# Patient Record
Sex: FEMALE | URBAN - METROPOLITAN AREA
[De-identification: names, ages, dates, MRNs, and addresses within clinical notes are randomized per-mention and may not be internally consistent; named-entity substitution may affect disease eponyms.]

---

## 2018-05-07 ENCOUNTER — APPOINTMENT (RX ONLY)
Dept: URBAN - METROPOLITAN AREA CLINIC 31 | Facility: CLINIC | Age: 83
Setting detail: DERMATOLOGY
End: 2018-05-07

## 2018-05-07 DIAGNOSIS — D22 MELANOCYTIC NEVI: ICD-10-CM

## 2018-05-07 DIAGNOSIS — L57.0 ACTINIC KERATOSIS: ICD-10-CM

## 2018-05-07 DIAGNOSIS — L81.4 OTHER MELANIN HYPERPIGMENTATION: ICD-10-CM

## 2018-05-07 PROBLEM — D22.5 MELANOCYTIC NEVI OF TRUNK: Status: ACTIVE | Noted: 2018-05-07

## 2018-05-07 PROBLEM — D48.5 NEOPLASM OF UNCERTAIN BEHAVIOR OF SKIN: Status: ACTIVE | Noted: 2018-05-07

## 2018-05-07 PROCEDURE — ? LIQUID NITROGEN

## 2018-05-07 PROCEDURE — 11100: CPT | Mod: 59

## 2018-05-07 PROCEDURE — 99202 OFFICE O/P NEW SF 15 MIN: CPT | Mod: 25

## 2018-05-07 PROCEDURE — ? BIOPSY BY SHAVE METHOD

## 2018-05-07 PROCEDURE — ? OBSERVATION AND MEASURE

## 2018-05-07 PROCEDURE — 17000 DESTRUCT PREMALG LESION: CPT

## 2018-05-07 PROCEDURE — ? COUNSELING

## 2018-05-07 PROCEDURE — 17003 DESTRUCT PREMALG LES 2-14: CPT

## 2018-05-07 ASSESSMENT — LOCATION DETAILED DESCRIPTION DERM
LOCATION DETAILED: LEFT INFERIOR LATERAL MALAR CHEEK
LOCATION DETAILED: RIGHT PROXIMAL DORSAL FOREARM
LOCATION DETAILED: RIGHT LATERAL MALAR CHEEK
LOCATION DETAILED: RIGHT INFERIOR VERMILION LIP
LOCATION DETAILED: LEFT LATERAL ABDOMEN
LOCATION DETAILED: LEFT DISTAL DORSAL FOREARM

## 2018-05-07 ASSESSMENT — LOCATION SIMPLE DESCRIPTION DERM
LOCATION SIMPLE: RIGHT FOREARM
LOCATION SIMPLE: ABDOMEN
LOCATION SIMPLE: RIGHT LIP
LOCATION SIMPLE: LEFT CHEEK
LOCATION SIMPLE: LEFT FOREARM
LOCATION SIMPLE: RIGHT CHEEK

## 2018-05-07 ASSESSMENT — LOCATION ZONE DERM
LOCATION ZONE: LIP
LOCATION ZONE: TRUNK
LOCATION ZONE: FACE
LOCATION ZONE: ARM

## 2018-05-07 NOTE — PROCEDURE: BIOPSY BY SHAVE METHOD
Anesthesia Volume In Cc: 0.5
Type Of Destruction Used: Curettage
Size Of Lesion In Cm: 0.6
Post-Care Instructions: I reviewed with the patient in detail post-care instructions. Patient is to keep the biopsy site dry overnight, and then apply vaseline twice daily until healed.
Billing Type: Third-Party Bill
Biopsy Method: Personna blade
Anesthesia Type: 1% lidocaine with epinephrine
Consent: Written consent was obtained and risks were reviewed including but not limited to scarring, infection, bleeding, scabbing, incomplete removal, nerve damage and allergy to anesthesia.
Electrodesiccation Text: The wound bed was treated with electrodesiccation after the biopsy was performed.
X Size Of Lesion In Cm: 0
Cryotherapy Text: The wound bed was treated with cryotherapy after the biopsy was performed.
Biopsy Type: H and E
Curettage Text: The wound bed was treated with curettage after the biopsy was performed.
Silver Nitrate Text: The wound bed was treated with silver nitrate after the biopsy was performed.
Notification Instructions: Patient will be notified of biopsy results. However, patient instructed to call the office if not contacted within 2 weeks.
Was A Bandage Applied: Yes
Bill 32080 For Specimen Handling/Conveyance To Laboratory?: no
Detail Level: Detailed
Hemostasis: Drysol and Electrocautery
Electrodesiccation And Curettage Text: The wound bed was treated with electrodesiccation and curettage after the biopsy was performed.
Lab: [id number]
Wound Care: Vaseline
Lab Facility: 
Dressing: bandage

## 2018-05-22 ENCOUNTER — APPOINTMENT (RX ONLY)
Dept: URBAN - METROPOLITAN AREA CLINIC 31 | Facility: CLINIC | Age: 83
Setting detail: DERMATOLOGY
End: 2018-05-22

## 2018-05-22 DIAGNOSIS — L57.0 ACTINIC KERATOSIS: ICD-10-CM

## 2018-05-22 PROCEDURE — 17000 DESTRUCT PREMALG LESION: CPT

## 2018-05-22 PROCEDURE — ? LIQUID NITROGEN

## 2018-05-22 PROCEDURE — ? COUNSELING

## 2018-05-22 ASSESSMENT — LOCATION SIMPLE DESCRIPTION DERM: LOCATION SIMPLE: LEFT EYELID

## 2018-05-22 ASSESSMENT — LOCATION ZONE DERM: LOCATION ZONE: EYELID

## 2018-05-22 ASSESSMENT — LOCATION DETAILED DESCRIPTION DERM: LOCATION DETAILED: LEFT LATERAL CANTHUS
